# Patient Record
Sex: FEMALE | Race: WHITE | NOT HISPANIC OR LATINO | Employment: FULL TIME
[De-identification: names, ages, dates, MRNs, and addresses within clinical notes are randomized per-mention and may not be internally consistent; named-entity substitution may affect disease eponyms.]

---

## 2017-05-11 PROBLEM — M79.641 BILATERAL HAND PAIN: Status: ACTIVE | Noted: 2017-05-11

## 2017-05-11 PROBLEM — M79.642 BILATERAL HAND PAIN: Status: ACTIVE | Noted: 2017-05-11

## 2017-09-29 PROCEDURE — 87086 URINE CULTURE/COLONY COUNT: CPT | Performed by: INTERNAL MEDICINE

## 2017-09-29 PROCEDURE — 87077 CULTURE AEROBIC IDENTIFY: CPT | Performed by: INTERNAL MEDICINE

## 2017-09-29 PROCEDURE — 87186 SC STD MICRODIL/AGAR DIL: CPT | Performed by: INTERNAL MEDICINE

## 2017-11-17 ENCOUNTER — HOSPITAL (OUTPATIENT)
Dept: OTHER | Age: 47
End: 2017-11-17
Attending: INTERNAL MEDICINE

## 2017-12-05 PROBLEM — M79.642 BILATERAL HAND PAIN: Status: RESOLVED | Noted: 2017-05-11 | Resolved: 2017-12-05

## 2017-12-05 PROBLEM — M79.641 BILATERAL HAND PAIN: Status: RESOLVED | Noted: 2017-05-11 | Resolved: 2017-12-05

## 2018-02-15 ENCOUNTER — CHARTING TRANS (OUTPATIENT)
Dept: OTHER | Age: 48
End: 2018-02-15

## 2018-12-11 PROCEDURE — 88175 CYTOPATH C/V AUTO FLUID REDO: CPT | Performed by: INTERNAL MEDICINE

## 2018-12-14 ENCOUNTER — HOSPITAL (OUTPATIENT)
Dept: OTHER | Age: 48
End: 2018-12-14
Attending: INTERNAL MEDICINE

## 2019-12-13 ENCOUNTER — HOSPITAL (OUTPATIENT)
Dept: OTHER | Age: 49
End: 2019-12-13
Attending: INTERNAL MEDICINE

## 2020-09-02 ENCOUNTER — TELEPHONE (OUTPATIENT)
Dept: BEHAVIORAL HEALTH | Age: 50
End: 2020-09-02

## 2020-09-16 ENCOUNTER — HOSPITAL ENCOUNTER (OUTPATIENT)
Dept: BEHAVIORAL HEALTH | Age: 50
Discharge: STILL ADMITTED OR ADMINISTRATIVE | End: 2020-09-16
Attending: CLINICAL NURSE SPECIALIST

## 2020-09-16 DIAGNOSIS — F33.1 MAJOR DEPRESSIVE DISORDER, RECURRENT EPISODE, MODERATE (CMD): Primary | ICD-10-CM

## 2020-09-16 PROCEDURE — 99214 OFFICE O/P EST MOD 30 MIN: CPT | Performed by: CLINICAL NURSE SPECIALIST

## 2020-09-16 ASSESSMENT — ENCOUNTER SYMPTOMS
ENDOCRINE NEGATIVE: 1
EYES NEGATIVE: 1
PSYCHIATRIC NEGATIVE: 1
ALLERGIC/IMMUNOLOGIC NEGATIVE: 1
RESPIRATORY NEGATIVE: 1
HEMATOLOGIC/LYMPHATIC NEGATIVE: 1
CONSTITUTIONAL NEGATIVE: 1
NEUROLOGICAL NEGATIVE: 1
GASTROINTESTINAL NEGATIVE: 1

## 2020-11-20 PROBLEM — R03.0 ELEVATED BLOOD-PRESSURE READING WITHOUT DIAGNOSIS OF HYPERTENSION: Status: ACTIVE | Noted: 2020-11-20

## 2020-12-17 ENCOUNTER — HOSPITAL ENCOUNTER (OUTPATIENT)
Dept: MAMMOGRAPHY | Age: 50
Discharge: HOME | End: 2020-12-17
Attending: INTERNAL MEDICINE

## 2020-12-17 DIAGNOSIS — Z12.31 ENCOUNTER FOR SCREENING MAMMOGRAM FOR MALIGNANT NEOPLASM OF BREAST: ICD-10-CM

## 2020-12-17 PROCEDURE — 77063 BREAST TOMOSYNTHESIS BI: CPT

## 2021-05-11 PROBLEM — G89.29 CHRONIC BACK PAIN: Status: ACTIVE | Noted: 2021-05-11

## 2021-05-11 PROBLEM — M54.9 CHRONIC BACK PAIN: Status: ACTIVE | Noted: 2021-05-11

## 2021-05-11 PROBLEM — F33.1 MAJOR DEPRESSIVE DISORDER, RECURRENT EPISODE, MODERATE (HCC): Status: ACTIVE | Noted: 2020-09-16

## 2021-06-07 ENCOUNTER — TELEPHONE (OUTPATIENT)
Dept: BEHAVIORAL HEALTH | Age: 51
End: 2021-06-07

## 2021-11-24 PROBLEM — R03.0 ELEVATED BLOOD-PRESSURE READING WITHOUT DIAGNOSIS OF HYPERTENSION: Status: RESOLVED | Noted: 2020-11-20 | Resolved: 2021-11-24

## 2021-12-14 ENCOUNTER — APPOINTMENT (OUTPATIENT)
Dept: MAMMOGRAPHY | Age: 51
End: 2021-12-14
Attending: NURSE PRACTITIONER

## 2021-12-17 ENCOUNTER — HOSPITAL ENCOUNTER (OUTPATIENT)
Dept: MAMMOGRAPHY | Age: 51
Discharge: HOME | End: 2021-12-17
Attending: NURSE PRACTITIONER

## 2021-12-17 DIAGNOSIS — Z12.31 ENCOUNTER FOR SCREENING MAMMOGRAM FOR MALIGNANT NEOPLASM OF BREAST: ICD-10-CM

## 2021-12-17 PROCEDURE — 77063 BREAST TOMOSYNTHESIS BI: CPT

## 2022-03-03 PROBLEM — M54.12 BRACHIAL (CERVICAL) NEURITIS: Status: ACTIVE | Noted: 2022-03-03

## 2022-03-03 PROBLEM — M48.02 FORAMINAL STENOSIS OF CERVICAL REGION: Status: ACTIVE | Noted: 2022-03-03

## 2022-12-01 ENCOUNTER — TELEPHONE (OUTPATIENT)
Dept: INTERNAL MEDICINE | Age: 52
End: 2022-12-01

## 2022-12-01 DIAGNOSIS — Z92.89 HISTORY OF MAMMOGRAPHY, SCREENING: Primary | ICD-10-CM

## 2022-12-09 ENCOUNTER — APPOINTMENT (OUTPATIENT)
Dept: CT IMAGING | Age: 52
End: 2022-12-09
Attending: INTERNAL MEDICINE

## 2022-12-20 ENCOUNTER — TELEPHONE (OUTPATIENT)
Dept: SCHEDULING | Age: 52
End: 2022-12-20

## 2022-12-22 ENCOUNTER — APPOINTMENT (OUTPATIENT)
Dept: CT IMAGING | Age: 52
End: 2022-12-22
Attending: INTERNAL MEDICINE

## 2023-01-05 ENCOUNTER — APPOINTMENT (OUTPATIENT)
Dept: CT IMAGING | Age: 53
End: 2023-01-05
Attending: INTERNAL MEDICINE

## 2023-01-06 ENCOUNTER — HOSPITAL ENCOUNTER (OUTPATIENT)
Dept: CT IMAGING | Age: 53
Discharge: HOME | End: 2023-01-06
Attending: INTERNAL MEDICINE

## 2023-01-06 DIAGNOSIS — Z92.89 HISTORY OF MAMMOGRAPHY, SCREENING: ICD-10-CM

## 2023-01-06 PROCEDURE — 77063 BREAST TOMOSYNTHESIS BI: CPT

## 2023-02-20 RX ORDER — METOCLOPRAMIDE 10 MG/1
10 TABLET ORAL ONCE
Status: CANCELLED | OUTPATIENT
Start: 2023-02-20 | End: 2023-02-20

## 2023-02-20 RX ORDER — LISINOPRIL 20 MG/1
20 TABLET ORAL DAILY
COMMUNITY

## 2023-02-22 ENCOUNTER — ANESTHESIA EVENT (OUTPATIENT)
Dept: SURGERY | Facility: HOSPITAL | Age: 53
End: 2023-02-22
Payer: COMMERCIAL

## 2023-02-22 ENCOUNTER — HOSPITAL ENCOUNTER (OUTPATIENT)
Facility: HOSPITAL | Age: 53
Setting detail: HOSPITAL OUTPATIENT SURGERY
Discharge: HOME HEALTH | End: 2023-02-22
Attending: ORTHOPAEDIC SURGERY | Admitting: ORTHOPAEDIC SURGERY
Payer: COMMERCIAL

## 2023-02-22 ENCOUNTER — APPOINTMENT (OUTPATIENT)
Dept: GENERAL RADIOLOGY | Facility: HOSPITAL | Age: 53
End: 2023-02-22
Attending: ORTHOPAEDIC SURGERY
Payer: COMMERCIAL

## 2023-02-22 ENCOUNTER — ANESTHESIA (OUTPATIENT)
Dept: SURGERY | Facility: HOSPITAL | Age: 53
End: 2023-02-22
Payer: COMMERCIAL

## 2023-02-22 VITALS
BODY MASS INDEX: 36.96 KG/M2 | RESPIRATION RATE: 18 BRPM | WEIGHT: 230 LBS | HEART RATE: 94 BPM | TEMPERATURE: 98 F | OXYGEN SATURATION: 95 % | SYSTOLIC BLOOD PRESSURE: 152 MMHG | DIASTOLIC BLOOD PRESSURE: 89 MMHG | HEIGHT: 66 IN

## 2023-02-22 DIAGNOSIS — Z01.818 PREOP TESTING: Primary | ICD-10-CM

## 2023-02-22 LAB — B-HCG UR QL: NEGATIVE

## 2023-02-22 PROCEDURE — 0RG20A0 FUSION OF 2 OR MORE CERVICAL VERTEBRAL JOINTS WITH INTERBODY FUSION DEVICE, ANTERIOR APPROACH, ANTERIOR COLUMN, OPEN APPROACH: ICD-10-PCS | Performed by: ORTHOPAEDIC SURGERY

## 2023-02-22 PROCEDURE — 76000 FLUOROSCOPY <1 HR PHYS/QHP: CPT | Performed by: ORTHOPAEDIC SURGERY

## 2023-02-22 PROCEDURE — 0RB30ZZ EXCISION OF CERVICAL VERTEBRAL DISC, OPEN APPROACH: ICD-10-PCS | Performed by: ORTHOPAEDIC SURGERY

## 2023-02-22 PROCEDURE — 01N10ZZ RELEASE CERVICAL NERVE, OPEN APPROACH: ICD-10-PCS | Performed by: ORTHOPAEDIC SURGERY

## 2023-02-22 PROCEDURE — 81025 URINE PREGNANCY TEST: CPT

## 2023-02-22 DEVICE — IMPLANTABLE DEVICE
Type: IMPLANTABLE DEVICE | Site: SPINE CERVICAL | Status: FUNCTIONAL
Brand: ACIS® PROTI 360°™

## 2023-02-22 DEVICE — SCREW CERV SKY FIX ST TI 14MM: Type: IMPLANTABLE DEVICE | Site: SPINE CERVICAL | Status: FUNCTIONAL

## 2023-02-22 DEVICE — IMPLANTABLE DEVICE: Type: IMPLANTABLE DEVICE | Site: SPINE CERVICAL | Status: FUNCTIONAL

## 2023-02-22 DEVICE — GRAFT BONE VIVIGEN SM CRYO: Type: IMPLANTABLE DEVICE | Site: SPINE CERVICAL | Status: FUNCTIONAL

## 2023-02-22 DEVICE — SCREW CERV SKY VAR SD TI 14MM: Type: IMPLANTABLE DEVICE | Site: SPINE CERVICAL | Status: FUNCTIONAL

## 2023-02-22 RX ORDER — VANCOMYCIN 1.75 GRAM/500 ML IN 0.9 % SODIUM CHLORIDE INTRAVENOUS
15 ONCE
Status: COMPLETED | OUTPATIENT
Start: 2023-02-22 | End: 2023-02-22

## 2023-02-22 RX ORDER — TIZANIDINE 2 MG/1
2 TABLET ORAL ONCE
Status: COMPLETED | OUTPATIENT
Start: 2023-02-22 | End: 2023-02-22

## 2023-02-22 RX ORDER — HYDROMORPHONE HYDROCHLORIDE 1 MG/ML
0.2 INJECTION, SOLUTION INTRAMUSCULAR; INTRAVENOUS; SUBCUTANEOUS EVERY 5 MIN PRN
Status: DISCONTINUED | OUTPATIENT
Start: 2023-02-22 | End: 2023-02-22

## 2023-02-22 RX ORDER — ROCURONIUM BROMIDE 10 MG/ML
INJECTION, SOLUTION INTRAVENOUS AS NEEDED
Status: DISCONTINUED | OUTPATIENT
Start: 2023-02-22 | End: 2023-02-22 | Stop reason: SURG

## 2023-02-22 RX ORDER — EPHEDRINE SULFATE 50 MG/ML
INJECTION, SOLUTION INTRAVENOUS AS NEEDED
Status: DISCONTINUED | OUTPATIENT
Start: 2023-02-22 | End: 2023-02-22 | Stop reason: SURG

## 2023-02-22 RX ORDER — SODIUM CHLORIDE, SODIUM LACTATE, POTASSIUM CHLORIDE, CALCIUM CHLORIDE 600; 310; 30; 20 MG/100ML; MG/100ML; MG/100ML; MG/100ML
INJECTION, SOLUTION INTRAVENOUS CONTINUOUS
Status: DISCONTINUED | OUTPATIENT
Start: 2023-02-22 | End: 2023-02-22

## 2023-02-22 RX ORDER — ACETAMINOPHEN 500 MG
1000 TABLET ORAL ONCE
Status: COMPLETED | OUTPATIENT
Start: 2023-02-22 | End: 2023-02-22

## 2023-02-22 RX ORDER — HYDROMORPHONE HYDROCHLORIDE 1 MG/ML
0.4 INJECTION, SOLUTION INTRAMUSCULAR; INTRAVENOUS; SUBCUTANEOUS EVERY 5 MIN PRN
Status: DISCONTINUED | OUTPATIENT
Start: 2023-02-22 | End: 2023-02-22

## 2023-02-22 RX ORDER — NEOSTIGMINE METHYLSULFATE 1 MG/ML
INJECTION, SOLUTION INTRAVENOUS AS NEEDED
Status: DISCONTINUED | OUTPATIENT
Start: 2023-02-22 | End: 2023-02-22 | Stop reason: SURG

## 2023-02-22 RX ORDER — NALOXONE HYDROCHLORIDE 0.4 MG/ML
80 INJECTION, SOLUTION INTRAMUSCULAR; INTRAVENOUS; SUBCUTANEOUS AS NEEDED
Status: DISCONTINUED | OUTPATIENT
Start: 2023-02-22 | End: 2023-02-22

## 2023-02-22 RX ORDER — FAMOTIDINE 20 MG/1
20 TABLET, FILM COATED ORAL ONCE
Status: COMPLETED | OUTPATIENT
Start: 2023-02-22 | End: 2023-02-22

## 2023-02-22 RX ORDER — MORPHINE SULFATE 10 MG/ML
6 INJECTION, SOLUTION INTRAMUSCULAR; INTRAVENOUS EVERY 10 MIN PRN
Status: DISCONTINUED | OUTPATIENT
Start: 2023-02-22 | End: 2023-02-22

## 2023-02-22 RX ORDER — PROCHLORPERAZINE EDISYLATE 5 MG/ML
5 INJECTION INTRAMUSCULAR; INTRAVENOUS EVERY 8 HOURS PRN
Status: DISCONTINUED | OUTPATIENT
Start: 2023-02-22 | End: 2023-02-22

## 2023-02-22 RX ORDER — MORPHINE SULFATE 4 MG/ML
4 INJECTION, SOLUTION INTRAMUSCULAR; INTRAVENOUS EVERY 10 MIN PRN
Status: DISCONTINUED | OUTPATIENT
Start: 2023-02-22 | End: 2023-02-22

## 2023-02-22 RX ORDER — DIPHENHYDRAMINE HYDROCHLORIDE 50 MG/ML
INJECTION INTRAMUSCULAR; INTRAVENOUS AS NEEDED
Status: DISCONTINUED | OUTPATIENT
Start: 2023-02-22 | End: 2023-02-22 | Stop reason: SURG

## 2023-02-22 RX ORDER — LIDOCAINE HYDROCHLORIDE 10 MG/ML
INJECTION, SOLUTION EPIDURAL; INFILTRATION; INTRACAUDAL; PERINEURAL AS NEEDED
Status: DISCONTINUED | OUTPATIENT
Start: 2023-02-22 | End: 2023-02-22 | Stop reason: SURG

## 2023-02-22 RX ORDER — HYDROCODONE BITARTRATE AND ACETAMINOPHEN 5; 325 MG/1; MG/1
1 TABLET ORAL ONCE AS NEEDED
Status: COMPLETED | OUTPATIENT
Start: 2023-02-22 | End: 2023-02-22

## 2023-02-22 RX ORDER — OXYCODONE HYDROCHLORIDE 5 MG/1
10 TABLET ORAL ONCE
Status: COMPLETED | OUTPATIENT
Start: 2023-02-22 | End: 2023-02-22

## 2023-02-22 RX ORDER — ACETAMINOPHEN 500 MG
1000 TABLET ORAL ONCE AS NEEDED
Status: COMPLETED | OUTPATIENT
Start: 2023-02-22 | End: 2023-02-22

## 2023-02-22 RX ORDER — TRIAMCINOLONE ACETONIDE 40 MG/ML
INJECTION, SUSPENSION INTRA-ARTICULAR; INTRAMUSCULAR AS NEEDED
Status: DISCONTINUED | OUTPATIENT
Start: 2023-02-22 | End: 2023-02-22 | Stop reason: HOSPADM

## 2023-02-22 RX ORDER — DEXAMETHASONE SODIUM PHOSPHATE 4 MG/ML
VIAL (ML) INJECTION AS NEEDED
Status: DISCONTINUED | OUTPATIENT
Start: 2023-02-22 | End: 2023-02-22 | Stop reason: SURG

## 2023-02-22 RX ORDER — HYDROMORPHONE HYDROCHLORIDE 1 MG/ML
0.6 INJECTION, SOLUTION INTRAMUSCULAR; INTRAVENOUS; SUBCUTANEOUS EVERY 5 MIN PRN
Status: DISCONTINUED | OUTPATIENT
Start: 2023-02-22 | End: 2023-02-22

## 2023-02-22 RX ORDER — GLYCOPYRROLATE 0.2 MG/ML
INJECTION, SOLUTION INTRAMUSCULAR; INTRAVENOUS AS NEEDED
Status: DISCONTINUED | OUTPATIENT
Start: 2023-02-22 | End: 2023-02-22 | Stop reason: SURG

## 2023-02-22 RX ORDER — MORPHINE SULFATE 4 MG/ML
2 INJECTION, SOLUTION INTRAMUSCULAR; INTRAVENOUS EVERY 10 MIN PRN
Status: DISCONTINUED | OUTPATIENT
Start: 2023-02-22 | End: 2023-02-22

## 2023-02-22 RX ORDER — ONDANSETRON 2 MG/ML
4 INJECTION INTRAMUSCULAR; INTRAVENOUS EVERY 6 HOURS PRN
Status: DISCONTINUED | OUTPATIENT
Start: 2023-02-22 | End: 2023-02-22

## 2023-02-22 RX ORDER — HYDROCODONE BITARTRATE AND ACETAMINOPHEN 5; 325 MG/1; MG/1
2 TABLET ORAL ONCE AS NEEDED
Status: COMPLETED | OUTPATIENT
Start: 2023-02-22 | End: 2023-02-22

## 2023-02-22 RX ADMIN — SODIUM CHLORIDE, SODIUM LACTATE, POTASSIUM CHLORIDE, CALCIUM CHLORIDE: 600; 310; 30; 20 INJECTION, SOLUTION INTRAVENOUS at 10:34:00

## 2023-02-22 RX ADMIN — EPHEDRINE SULFATE 10 MG: 50 INJECTION, SOLUTION INTRAVENOUS at 08:28:00

## 2023-02-22 RX ADMIN — LIDOCAINE HYDROCHLORIDE 50 MG: 10 INJECTION, SOLUTION EPIDURAL; INFILTRATION; INTRACAUDAL; PERINEURAL at 07:42:00

## 2023-02-22 RX ADMIN — EPHEDRINE SULFATE 5 MG: 50 INJECTION, SOLUTION INTRAVENOUS at 09:28:00

## 2023-02-22 RX ADMIN — NEOSTIGMINE METHYLSULFATE 3 MG: 1 INJECTION, SOLUTION INTRAVENOUS at 10:37:00

## 2023-02-22 RX ADMIN — GLYCOPYRROLATE 0.5 MG: 0.2 INJECTION, SOLUTION INTRAMUSCULAR; INTRAVENOUS at 10:37:00

## 2023-02-22 RX ADMIN — DIPHENHYDRAMINE HYDROCHLORIDE 12.5 MG: 50 INJECTION INTRAMUSCULAR; INTRAVENOUS at 07:48:00

## 2023-02-22 RX ADMIN — DEXAMETHASONE SODIUM PHOSPHATE 10 MG: 4 MG/ML VIAL (ML) INJECTION at 07:48:00

## 2023-02-22 RX ADMIN — ROCURONIUM BROMIDE 50 MG: 10 INJECTION, SOLUTION INTRAVENOUS at 07:42:00

## 2023-02-22 NOTE — ANESTHESIA POSTPROCEDURE EVALUATION
Patient: Jeff Flannery    Procedure Summary     Date: 02/22/23 Room / Location: [address] [place] [address] MAIN OR 02 / [address] [place] [address] MAIN OR    Anesthesia Start: 3624 Anesthesia Stop:     Procedure: Anterior cervical discectomy and fusion cervical 4-5, cervical 5-6 (Spine Cervical) Diagnosis: (Cervical radiculopathy, foraminal stenosis)    Surgeons: Spenser Gracia MD Anesthesiologist: Garfield Sadler MD    Anesthesia Type: general ASA Status: 2          Anesthesia Type: general    Vitals Value Taken Time   /97 02/22/23 1058   Temp 98 02/22/23 1058   Pulse 99 02/22/23 1057   Resp 16 02/22/23 1058   SpO2 92 % 02/22/23 1057   Vitals shown include unvalidated device data.     [address] [place] [address] AN Post Evaluation:   Patient Evaluated in PACU  Patient Participation: complete - patient participated  Level of Consciousness: awake  Pain Score: 0  Pain Management: adequate  Airway Patency:patent  Dental exam unchanged from preop  Yes    Cardiovascular Status: acceptable  Respiratory Status: acceptable  Postoperative Hydration acceptable      Veronica Aaron MD  2/22/2023 10:58 AM
Alert and oriented, no focal deficits, no motor or sensory deficits.

## 2023-02-22 NOTE — OPERATIVE REPORT
Operative Note     Patient Name: Castro Meza  Preoperative Diagnosis: Cervical Radiculopathy  Postoperative Diagnosis: Same  Primary Surgeon: Alley Marcelo MD  Assistant: Alison Zhang PA-C  Procedures:   1.) C4-6 Anterior Cervical Discectomy/Fusion CPT 92238 50360  2.) Insertion of Interbody Biomechanical Device CPT 62328 X2   3.) C4-6 Anterior Instrumentation   CPT 37835  4.) Use of CellularAllograft     CPT 62984  4.) Use of Local Bone Autograft   CPT 16580  Surgical Findings: Cervical Spondylosis  Anesthesia: General Endotracheal Anesthesia  Estimated Blood Loss: 15cc  Drains: None  Implants:   Depuy-Synthes [place] Anterior Cervical Plate  Depuy-Fashion Project ProTi Cage   Depuy- [employer] Project 14mm Screws (Fixed at C5 Variable at C4 and C6)  Vivigen Cellular Aullograft  Specimen: None  Condition: Stable  Complications: None    Surgical Indications:   Castro Meza is an 50year old female who presented with a history of neck and right > left upper extremity pain, numbness, paraesthesias with symptoms refractory to nonsurgical care. Workup included radiographs and axial imaging. The patient was diagnosed with cervical stenosis and radiculopathy. The patient's symptoms were recalcitrant to non-surgical care including activity modificiation, oral medications, PT and injection therapy. The option of surgery was discussed with the patient. The risks, benefits, and alternatives were discussed.  Risks include, but are not limited wound complications, bleeding, infection, neurologic injury or onset of a new neurological issue including paralysis, dural tear, pseudoarthrosis (particularly in longer constructs and in smokers), fracture, hardware failure, adjacent segment or junctional breakdown, recurrent laryngeal nerve palsy, Delmer's syndrome, dysphagia or dysphonia (These are usually temporary and will resolve within a few weeks to few months timeframe), hematoma requiring evacuation, repeat intubation after surgery, esophageal or tracheal injury, vertebral artery injury (stroke), and the need for possible additional future surgery. We also discussed the possible persistence of symptoms and adjacent segment degeneration. The patient verbalized their understanding and wished to proceed with surgery. Surgical Procedure: The patient was met in the preop holding area, we reviewed elements of the patient's history and physical examination along with the planned surgical procedure. The patient was in agreement and the surgical site was marked with indelible ink. The patient was brought back to the operative theater. The patient was administered prophylactic antibiotics per SCIP guidelines. After successful induction of general endotracheal anesthesia, sequential compression devices were applied to bilateral lower extremities. The patient was then positioned supine on the operating table. The head was positioned carefully on a padded circular head rest. The patient's orbits, peripheral nerves, and bony prominences were padded and protected. Using a surgical instrument a radiograph was taken to localize the skin incision relative to the patient's anatomy. The neck was then prepped and draped in the usual sterile fashion. A safety timeout was performed identifying the patient by name, MRN, and date of birth; the nature of the procedure, surgical site, and concerns were addressed with the operating room staff. All were in agreement and we proceeded with the procedure. A transverse incision, consistent with the Sofia-Agrawal approach, was made at the C5 level on the left. The skin was incised with a scalpel and dissection was carried out with electrocautery. The subplatysmal membrane was then dissected free. The plane between the deep cervical fascia and the carotid sheath was developed bluntly. The carotid was palpated, protected, and retracted laterally.   After exposing the prevertebral fascia, radiographic marker was placed in C4/5. A radiograph was then obtained to confirm the appropriate surgical level(s) prior to proceeding with discectomy. Referencing off the radiograph, the C4/5  interspace was developed and the edges of the longus coli muscles elevated from their undersurface with bipolar electrocautery. The Trimline retractor of appropriate size was placed. Pritchett pins were then placed at the most cephalad and caudal vertebral bodies. The microscope was draped and used for the remainder of the procedure. After distracting the space, a #15 blade was used to create an initial annulotomy. This was extended laterally using Kerrison rongeurs. The overhanging osteophyte at the cephalad cervical body was resected. Using Marjorie curettes and Kerrison Rongeurs, the discectomy was completed. After decompressing the posterior osteophytes and the neuroforamen, the PLL was resected. A blunt nerve probe can then be passed into the bilateral neural foramen without any resistance. Next high-speed bur was utilized to decorticate the most lateral aspects of the disc space. Next we sequentially trialed and then placed an appropriate sized interbody deice packed with allograft and autograft into position. Local bone graft was then deposited into the disc space on the lateral aspects. At this point the caspar pins were then removed from C4 and placed into C6. We turned our attention to C5/6. After distracting the space, a #15 blade was used to create an initial annulotomy. This was extended laterally using Kerrison rongeurs. The overhanging osteophyte at the cephalad cervical body was resected. Using Marjorie curettes and Kerrison Rongeurs, the discectomy was completed. After decompressing the posterior osteophytes and the neuroforamen, the PLL was resected. A blunt nerve probe can then be passed into the bilateral neural foramen without any resistance.   Next high-speed bur was utilized to decorticate the most lateral aspects of the disc space. Next we sequentially trialed and then placed an appropriate sized interbody deice packed with allograft and autograft into position. Local bone graft was then deposited into the disc space on the lateral aspects. At this point the caspar pins were then removed, hemostasis was achieved an appropriate sized DePuy Synthes [place] plate was selected and applied. Screws were placed and a radiograph obtained to confirm position of the graft and evaluate plate fixation     All bleeders were meticulously controlled using bipolar and unipolar electrocautery. The wound was thoroughly irrigated at the time of closure. There was no active bleeding. 1cc of 40mg Kenalog was placed in the retropharyngeal space in a flowseal matrix. Closure was done in layers with interrupted 2-0 Vicryl for the fascia. The skin was closed with a Monocryl suture. Steri-Strips and a sterile dressing were then applied. The patient was woken from anesthesia and transferred to the PACU in stable condition. All counts were correct x2 at the end of the procedure. A physician  assistant was necessary during the case to provide retraction and to manage wound suction so that I could operate with two hands under the surgical microscope. Dilip Modi.  Arun Xavier MD  Division of Spine Surgery  [facility]

## 2023-02-22 NOTE — ANESTHESIA PROCEDURE NOTES
Airway  Date/Time: 2/22/2023 7:45 AM  Urgency: Elective    Airway not difficult    General Information and Staff    Patient location during procedure: OR  Anesthesiologist: Flaquito Guzman MD  Performed: anesthesiologist   Performed by: Flaquito Guzman MD  Authorized by: Flaquito Guzman MD      Indications and Patient Condition  Indications for airway management: anesthesia  Sedation level: deep  Preoxygenated: yes  Patient position: sniffing  Mask difficulty assessment: 1 - vent by mask    Final Airway Details  Final airway type: endotracheal airway      Successful airway: ETT  Cuffed: yes   Successful intubation technique: Video laryngoscopy  Facilitating devices/methods: intubating stylet  Endotracheal tube insertion site: oral  Blade: GlideScope  Blade size: #3  ETT size (mm): 7.0    Cormack-Lehane Classification: grade I - full view of glottis  Placement verified by: chest auscultation and capnometry   Measured from: teeth  ETT to teeth (cm): 21  Number of attempts at approach: 1

## 2023-02-22 NOTE — H&P
Patient: Castro Meza  Medical Record Number: [id number]  Referring Physician:  No ref. provider found    I have carefully reviewed the patient's intake questionnaire before our encounter today. HISTORY OF CHIEF COMPLAINT:    Castro Meza is a 50year old female, who presents for surgical intervention of a C4-C5 and C5-C6 ACDF. She has bilateral upper extremity symptoms that have been recalcitrant to non-surgical management. PHYSICAL EXAMIMATION   General: AAOx3  Cardiac: normal rate  Lungs: non-labored breathing  Exam: Full strength bilateral upper extremities except 5-/5 in intrinsics                                                                                     MEDICAL DECISION MAKING:   Impression: cervical radiculopathy    Cervical stenosis    Plan: We will proceed with surgical intervention as planned. All of her questions were answered to her satisfaction. The patient indicates understanding of these issues and agrees to the plan. Alison Zhang PA-C/ Ricco Schwarz.  Cyndra Lundborg, MD, FAAOS  Division of [address] [place] [address]

## 2023-07-26 ENCOUNTER — EXTERNAL RECORD (OUTPATIENT)
Dept: HEALTH INFORMATION MANAGEMENT | Facility: OTHER | Age: 53
End: 2023-07-26

## 2023-07-28 ENCOUNTER — EXTERNAL RECORD (OUTPATIENT)
Dept: HEALTH INFORMATION MANAGEMENT | Facility: OTHER | Age: 53
End: 2023-07-28

## 2023-08-01 ENCOUNTER — TELEPHONE (OUTPATIENT)
Dept: NEUROSURGERY | Age: 53
End: 2023-08-01

## 2023-08-01 DIAGNOSIS — M54.2 NECK PAIN: Primary | ICD-10-CM

## 2023-08-12 ENCOUNTER — HOSPITAL ENCOUNTER (OUTPATIENT)
Dept: CT IMAGING | Age: 53
Discharge: HOME | End: 2023-08-12
Attending: NURSE PRACTITIONER

## 2023-08-12 DIAGNOSIS — M54.2 NECK PAIN: ICD-10-CM

## 2023-08-12 PROCEDURE — 72125 CT NECK SPINE W/O DYE: CPT

## 2023-10-23 ENCOUNTER — OFFICE VISIT (OUTPATIENT)
Dept: NEUROSURGERY | Age: 53
End: 2023-10-23

## 2023-10-23 VITALS
RESPIRATION RATE: 18 BRPM | SYSTOLIC BLOOD PRESSURE: 154 MMHG | DIASTOLIC BLOOD PRESSURE: 100 MMHG | HEART RATE: 77 BPM | TEMPERATURE: 97.9 F

## 2023-10-23 DIAGNOSIS — M54.2 NECK PAIN: Primary | ICD-10-CM

## 2023-10-23 PROCEDURE — 99243 OFF/OP CNSLTJ NEW/EST LOW 30: CPT | Performed by: NEUROLOGICAL SURGERY

## 2023-10-23 RX ORDER — CELECOXIB 200 MG/1
CAPSULE ORAL
COMMUNITY
Start: 2023-09-29

## 2023-10-23 RX ORDER — LEVONORGESTREL AND ETHINYL ESTRADIOL AND ETHINYL ESTRADIOL 150-30(84)
1 KIT ORAL DAILY
COMMUNITY
Start: 2023-09-07

## 2023-10-23 RX ORDER — LISINOPRIL 20 MG/1
1 TABLET ORAL DAILY
COMMUNITY

## 2023-10-23 RX ORDER — TIZANIDINE 4 MG/1
4 TABLET ORAL
COMMUNITY
Start: 2023-09-29 | End: 2023-12-28

## 2024-03-07 ENCOUNTER — APPOINTMENT (OUTPATIENT)
Dept: CT IMAGING | Age: 54
End: 2024-03-07

## 2024-03-07 DIAGNOSIS — Z12.31 ENCOUNTER FOR SCREENING MAMMOGRAM FOR MALIGNANT NEOPLASM OF BREAST: ICD-10-CM

## 2024-03-07 PROCEDURE — 77063 BREAST TOMOSYNTHESIS BI: CPT

## 2025-07-02 ENCOUNTER — OFFICE VISIT (OUTPATIENT)
Dept: WOUND CARE | Facility: HOSPITAL | Age: 55
End: 2025-07-02
Attending: NURSE PRACTITIONER

## (undated) DEVICE — 1010 S-DRAPE TOWEL DRAPE 10/BX: Brand: STERI-DRAPE™

## (undated) DEVICE — MICRO KOVER: Brand: UNBRANDED

## (undated) DEVICE — SUT MONOCRYL 3-0 PS-1 Y936H

## (undated) DEVICE — SUT VICRYL 3-0 CP-2 J761D

## (undated) DEVICE — X-RAY DETECTABLE SPONGES,16 PLY: Brand: VISTEC

## (undated) DEVICE — GAMMEX® PI HYBRID SIZE 7, STERILE POWDER-FREE SURGICAL GLOVE, POLYISOPRENE AND NEOPRENE BLEND: Brand: GAMMEX

## (undated) DEVICE — DRAPE SRG PED 124X74IN LAP 2

## (undated) DEVICE — SOL NACL IRRIG 0.9% 1000ML BTL

## (undated) DEVICE — FLOSEAL HEMOSTATIC MATRIX, 5ML: Brand: FLOSEAL HEMOSTATIC MATRIX

## (undated) DEVICE — DRAPE SHEET LARGE 76X55

## (undated) DEVICE — DRAPE SLUSH WARMER 44X66

## (undated) DEVICE — 3 ML SYRINGE LUER-LOCK TIP: Brand: MONOJECT

## (undated) DEVICE — FORCEP CUSH BAY BP DISP 7.5IN

## (undated) DEVICE — 3M™ STERI-STRIP™ REINFORCED ADHESIVE SKIN CLOSURES, R1547, 1/2 IN X 4 IN (12 MM X 100 MM), 6 STRIPS/ENVELOPE: Brand: 3M™ STERI-STRIP™

## (undated) DEVICE — SUT SILK 2-0 SA85H

## (undated) DEVICE — C-ARMOR C-ARM EQUIPMENT COVERS CLEAR STERILE UNIVERSAL FIT 12 PER CASE: Brand: C-ARMOR

## (undated) DEVICE — PEN: MARKING STD PT 100/CS: Brand: MEDICAL ACTION INDUSTRIES

## (undated) DEVICE — INTENDED USE FOR SURGICAL MARKING ON INTACT SKIN, ALSO PROVIDES A PERMANENT METHOD OF IDENTIFYING OBJECTS IN THE OPERATING ROOM: Brand: WRITESITE® PLUS MINI PREP RESISTANT MARKER

## (undated) DEVICE — GAMMEX® PI HYBRID SIZE 7.5, STERILE POWDER-FREE SURGICAL GLOVE, POLYISOPRENE AND NEOPRENE BLEND: Brand: GAMMEX

## (undated) DEVICE — 3M™ TEGADERM™ TRANSPARENT FILM DRESSING, 1626W, 4 IN X 4-3/4 IN (10 CM X 12 CM), 50 EACH/CARTON, 4 CARTON/CASE: Brand: 3M™ TEGADERM™

## (undated) DEVICE — SKYLINE ANTERIOR CERVICAL PLATE SYSTEM DRILL 2.2 X 12MM: Brand: SKYLINE

## (undated) DEVICE — 3.0MM PRECISION NEURO (MATCH HEAD)

## (undated) DEVICE — APPLICATOR CHLORAPREP 10.5ML

## (undated) DEVICE — GAMMEX® PI HYBRID SIZE 8.5, STERILE POWDER-FREE SURGICAL GLOVE, POLYISOPRENE AND NEOPRENE BLEND: Brand: GAMMEX

## (undated) DEVICE — GAUZE TRAY STERILE 4X4 12PLY

## (undated) DEVICE — CERVICAL CDS: Brand: MEDLINE INDUSTRIES, INC.